# Patient Record
Sex: FEMALE | Race: WHITE | NOT HISPANIC OR LATINO | URBAN - METROPOLITAN AREA
[De-identification: names, ages, dates, MRNs, and addresses within clinical notes are randomized per-mention and may not be internally consistent; named-entity substitution may affect disease eponyms.]

---

## 2019-02-04 ASSESSMENT — MIFFLIN-ST. JEOR: SCORE: 1630.18

## 2019-02-06 ENCOUNTER — ANESTHESIA - HEALTHEAST (OUTPATIENT)
Dept: SURGERY | Facility: CLINIC | Age: 44
End: 2019-02-06

## 2019-02-07 ENCOUNTER — SURGERY - HEALTHEAST (OUTPATIENT)
Dept: SURGERY | Facility: CLINIC | Age: 44
End: 2019-02-07

## 2019-02-07 ASSESSMENT — MIFFLIN-ST. JEOR
SCORE: 1613.85
SCORE: 1594.74

## 2019-02-11 ENCOUNTER — COMMUNICATION - HEALTHEAST (OUTPATIENT)
Dept: SCHEDULING | Facility: CLINIC | Age: 44
End: 2019-02-11

## 2019-02-21 ENCOUNTER — RECORDS - HEALTHEAST (OUTPATIENT)
Dept: ADMINISTRATIVE | Facility: OTHER | Age: 44
End: 2019-02-21

## 2019-02-21 ENCOUNTER — HOSPITAL ENCOUNTER (OUTPATIENT)
Dept: CT IMAGING | Facility: CLINIC | Age: 44
Discharge: HOME | End: 2019-02-21
Attending: PHYSICIAN ASSISTANT

## 2019-02-21 DIAGNOSIS — G89.18 POST-OP PAIN: ICD-10-CM

## 2021-05-27 ENCOUNTER — RECORDS - HEALTHEAST (OUTPATIENT)
Dept: ADMINISTRATIVE | Facility: CLINIC | Age: 46
End: 2021-05-27

## 2021-06-02 VITALS — WEIGHT: 199.4 LBS | BODY MASS INDEX: 29.53 KG/M2 | HEIGHT: 69 IN

## 2021-06-16 PROBLEM — J45.20 MILD INTERMITTENT ASTHMA IN ADULT WITHOUT COMPLICATION: Chronic | Status: ACTIVE | Noted: 2019-02-07

## 2021-06-16 PROBLEM — K21.9 GERD WITHOUT ESOPHAGITIS: Chronic | Status: ACTIVE | Noted: 2019-02-07

## 2021-06-16 PROBLEM — M51.369 DISC DEGENERATION, LUMBAR: Status: ACTIVE | Noted: 2019-02-07

## 2021-06-16 PROBLEM — F51.01 PRIMARY INSOMNIA: Chronic | Status: ACTIVE | Noted: 2019-02-07

## 2021-06-23 NOTE — ANESTHESIA POSTPROCEDURE EVALUATION
Patient: Shireen Prado  BILATERAL LUMBAR 4-5 ANTERIOR AND POSTERIOR LUMBAR INTERBODY FUSION WITH ILIAC CREST BONE GRAFT, EXPOSURE AND CLOSURE, LUMBAR ANTERIOR APPROACH, FOR SPINAL SURGERY, BY GENERAL SURGERY  Anesthesia type: general    Patient location: PACU  Last vitals:   Vitals:    02/07/19 1450   BP: 123/74   Pulse: 95   Resp: 16   Temp: 37.1  C (98.8  F)   SpO2: 100%     Post vital signs: stable  Level of consciousness: awake and responds to simple questions  Post-anesthesia pain: pain controlled  Post-anesthesia nausea and vomiting: no  Pulmonary: unassisted, return to baseline  Cardiovascular: stable and blood pressure at baseline  Hydration: adequate  Anesthetic events: no    QCDR Measures:  ASA# 11 - Cinda-op Cardiac Arrest: ASA11B - Patient did NOT experience unanticipated cardiac arrest  ASA# 12 - Cinda-op Mortality Rate: ASA12B - Patient did NOT die  ASA# 13 - PACU Re-Intubation Rate: ASA13B - Patient did NOT require a new airway mgmt  ASA# 10 - Composite Anes Safety: ASA10A - No serious adverse event    Additional Notes:

## 2021-06-23 NOTE — ANESTHESIA CARE TRANSFER NOTE
Last vitals:   Vitals:    02/07/19 1142   BP: 121/72   Pulse: 95   Resp: 16   Temp: 36.9  C (98.4  F)   SpO2: 100%     Patient's level of consciousness is drowsy  Spontaneous respirations: yes  Maintains airway independently: yes  Dentition unchanged: yes  Oropharynx: oropharynx clear of all foreign objects    QCDR Measures:  ASA# 20 - Surgical Safety Checklist: WHO surgical safety checklist completed prior to induction    PQRS# 430 - Adult PONV Prevention: 4558F - Pt received => 2 anti-emetic agents (different classes) preop & intraop  ASA# 8 - Peds PONV Prevention: NA - Not pediatric patient, not GA or 2 or more risk factors NOT present  PQRS# 424 - Cinda-op Temp Management: 4559F - At least one body temp DOCUMENTED => 35.5C or 95.9F within required timeframe  PQRS# 426 - PACU Transfer Protocol: - Transfer of care checklist used  ASA# 14 - Acute Post-op Pain: ASA14B - Patient did NOT experience pain >= 7 out of 10

## 2021-06-23 NOTE — TELEPHONE ENCOUNTER
Discharged from [facility] after spinal fusion done 2/10/19 by Dr. Riley Jamil orthopedics    States they were doing the dressing change and they found a blister the size of the peanut.   This is on her Hip incision site and it is itchy, located at the edge of the gauze, not under the tape.   Questions if it is from the back brace rubbing.     No fever  No other symptoms    Requests call from surgeon to find best course of action.     Okay to leave a detailed message on voicemail.     Pharmacy verified    Paged General Surgery who states to page Dr. Jamil.     Paged Dr. Lucero from [facility]. They will call patient to discuss.     Marilia Lomeli RN Triage Care Connection      Reason for Disposition    [1] Caller has URGENT question AND [2] triager unable to answer question    Protocols used: POST-OP INCISION SYMPTOMS-A-AH

## 2021-06-27 ENCOUNTER — HEALTH MAINTENANCE LETTER (OUTPATIENT)
Age: 46
End: 2021-06-27

## 2021-10-17 ENCOUNTER — HEALTH MAINTENANCE LETTER (OUTPATIENT)
Age: 46
End: 2021-10-17

## 2022-07-24 ENCOUNTER — HEALTH MAINTENANCE LETTER (OUTPATIENT)
Age: 47
End: 2022-07-24

## 2022-10-02 ENCOUNTER — HEALTH MAINTENANCE LETTER (OUTPATIENT)
Age: 47
End: 2022-10-02

## 2023-08-12 ENCOUNTER — HEALTH MAINTENANCE LETTER (OUTPATIENT)
Age: 48
End: 2023-08-12

## 2025-01-21 ENCOUNTER — TRANSFERRED RECORDS (OUTPATIENT)
Dept: HEALTH INFORMATION MANAGEMENT | Facility: CLINIC | Age: 50
End: 2025-01-21
Payer: COMMERCIAL

## 2025-01-30 ENCOUNTER — MEDICAL CORRESPONDENCE (OUTPATIENT)
Dept: HEALTH INFORMATION MANAGEMENT | Facility: CLINIC | Age: 50
End: 2025-01-30
Payer: COMMERCIAL

## 2025-02-04 ENCOUNTER — TRANSCRIBE ORDERS (OUTPATIENT)
Dept: OTHER | Age: 50
End: 2025-02-04

## 2025-02-04 DIAGNOSIS — R93.89 ABNORMAL MRI: Primary | ICD-10-CM

## 2025-02-05 NOTE — TELEPHONE ENCOUNTER
Records Requested   February 5, 2025 10:44 AM   [id number]   Facility  [facility]   Outcome 10:44 am Sent request for imaging to be pushed to PACS. -MIRZA Conrad on 2/5/2025 at 3:47 PM Imaging resolved into PACS. -MIRZA     RECORDS RECEIVED FROM: Care Everywhere   REASON FOR VISIT: Abnormal MRI   PROVIDER: Samuel Fletcher MD   DATE OF APPT: 2/10/25 @ 4:30 pm    NOTES (FOR ALL VISITS) STATUS DETAILS   OFFICE NOTE from referring provider Care Everywhere 2/4/25 (Transcribed Orders), 1/21/25 Adalgisa Barcenas PA-C @[facility]     MEDICATION LIST Internal    IMAGING  (FOR ALL VISITS)     MRI (HEAD, NECK, SPINE) PACS [facility]  1/27/25 MR Brain/Neck

## 2025-02-06 NOTE — PROGRESS NOTES
[facility] Neurology Consultation    Shireen Prado MRN# [id number]   Age: 49 year old YOB: 1975     Requesting physician: Adalgisa Bridges     Reason for Consultation: dizziness           Assessment and Plan:   Assessment:  Shireen Prado is a 49 year old female who presents today for evaluation of episodic dizziness. Patient reports episodes of non-positional lightheadedness with associated tingling of extremities lasting for several minutes. She recently had MRI brain imaging, which did not show a cause of symptoms. We discussed doing heart monitor to evaluate for arrhythmia and EEG to evaluate for epileptiform activity. Vasovagal reaction is a consideration, but is a bit atypical based on her description.     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 We will do repeat imaging in about 6 months to monitor stability.      Plan:  - MRA head without contrast  - EEG 3 hours  - Zio patch 2 weeks    Follow up in Neurology clinic pending above    Samuel Fletcher MD   of Neurology  [facility]  ---------------------------------------------------------------------------------------------------------------------------        History of Presenting Symptoms:   Shireen Prado is a 49 year old female who presents today for evaluation of episodic dizziness.     Patient had episodes of dizziness on January 16th, 17th, 18th. The first one she was just sitting there and suddenly it happened. It is a lightheaded feeling like she might pass out. Things don't spinning. She then develops tingling in the extremities. It just last a few minutes. She denies heart palpitations.The 3 episodes were identical except the last one lasted longer. It was like 3-5 minutes.      Her blood pressure was normal during one episode. Her pulse was normal when she checked it too, but that was after intense symptoms passed.     She has a little bit of a headache after it passes and  a foggy feeling for an hour.     She has had episodes previously of this same dizziness. The first one happened while she walking on the beach in 2023. She had one episode when she laid in bed. It had been happening every few months.     She passed out a lot when she was a kid. These were related to holding her breath.     She also got knocked out with a concussion previously. She has no personal or family history of seizures. She has no history of brain infections.     She has had some mild headaches lately. They aren't bad enough to take a medication.       Past Medical History:     Patient Active Problem List   Diagnosis    Disc degeneration, lumbar    Mild intermittent asthma in adult without complication    Primary insomnia    GERD without esophagitis     No past medical history on file.     Past Surgical History:     Past Surgical History:   Procedure Laterality Date    ARTHROSCOPY KNEE       SECTION      ENDOMETRIAL ABLATION      LASIK      SHOULDER SURGERY      TUBAL LIGATION      WISDOM TOOTH EXTRACTION      ZZC LUMBAR SPINE FUSN,POST INTRBDY N/A 2019    Procedure: BILATERAL LUMBAR 4-5 ANTERIOR AND POSTERIOR LUMBAR INTERBODY FUSION WITH ILIAC CREST BONE GRAFT;  Surgeon: Riley Jamil MD;  Location: [facility] OR;  Service: Spine        Social History:     Social History     Tobacco Use    Smoking status: Never    Smokeless tobacco: Never   Substance Use Topics    Alcohol use: Yes     Comment: Alcoholic Drinks/day: occasional        Family History:   No family history on file.     Medications:     Current Outpatient Medications   Medication Sig Dispense Refill    acetaminophen (TYLENOL) 500 MG tablet [ACETAMINOPHEN (TYLENOL) 500 MG TABLET] Take 1 tablet (500 mg total) by mouth every 4 (four) hours.  0    albuterol (PROAIR HFA;PROVENTIL HFA;VENTOLIN HFA) 90 mcg/actuation inhaler [ALBUTEROL (PROAIR HFA;PROVENTIL HFA;VENTOLIN HFA) 90 MCG/ACTUATION INHALER] Inhale 2 puffs  every 6 (six) hours as needed for wheezing.      aspirin 81 MG EC tablet [ASPIRIN 81 MG EC TABLET] Take 1 tablet (81 mg total) by mouth daily.  0    cycloSPORINE (RESTASIS) 0.05 % ophthalmic emulsion [CYCLOSPORINE (RESTASIS) 0.05 % OPHTHALMIC EMULSION] 1 drop 2 (two) times a day.      esomeprazole (NEXIUM) 20 MG capsule [ESOMEPRAZOLE (NEXIUM) 20 MG CAPSULE] Take 20 mg by mouth daily before breakfast.      melatonin 3 mg Tab tablet [MELATONIN 3 MG TAB TABLET] Take 1 tablet (3 mg total) by mouth at bedtime as needed.  0    oxyCODONE (ROXICODONE) 5 MG immediate release tablet [OXYCODONE (ROXICODONE) 5 MG IMMEDIATE RELEASE TABLET] Take 1 tablet (5 mg total) by mouth every 4 (four) hours. 40 tablet 0    polyethylene glycol (MIRALAX) 17 gram packet [POLYETHYLENE GLYCOL (MIRALAX) 17 GRAM PACKET] Take 1 packet (17 g total) by mouth 2 (two) times a day.  0    senna-docusate (PERICOLACE) 8.6-50 mg tablet [SENNA-DOCUSATE (PERICOLACE) 8.6-50 MG TABLET] Take 1 tablet by mouth 2 (two) times a day.  0     No current facility-administered medications for this visit.        Allergies:     Allergies   Allergen Reactions    Pineapple Anaphylaxis    Morphine Nausea and Vomiting    Fluticasone-Salmeterol Hives    Terbutaline Hives        Review of Systems:   As above     Physical Exam:   Vitals: BP (!) 136/92 (BP Location: Right arm, Patient Position: Sitting, Cuff Size: Adult Large)   Pulse 73   Resp 16   SpO2 99%  BP sitting 130/89 HR 71 / standing 3 minutes 136/89 HR 66  General: Seated comfortably in no acute distress.  HEENT: Optic discs appear sharp on funduscopic exam.   Lungs: breathing comfortably  Neurologic:     Mental Status: Fully alert, attentive. Language normal, speech clear and fluent, no paraphasic errors.      Cranial Nerves: Visual fields intact. PERRL. EOMI with normal smooth pursuit. Facial sensation intact/symmetric. Facial movements symmetric. Hearing not formally tested but intact to conversation. Palate  elevation symmetric, uvula midline. No dysarthria. Shoulder shrug strong bilaterally. Tongue protrusion midline.     Motor: No tremors or other abnormal movements observed. Muscle tone normal throughout. Strength 5/5 throughout upper and lower extremities.     Deep Tendon Reflexes: 2+/symmetric throughout upper and lower extremities. No clonus.      Sensory: Intact/symmetric to light touch throughout upper and lower extremities.      Coordination: Finger-nose-finger and heel-shin intact without dysmetria.      Gait: Normal, steady casual gait.         Data: Pertinent prior to visit   Imaging:  HEAD MRI:   1.  Age-appropriate brain MRI without evidence for acute intracranial process.     HEAD MRA:   1.  No significant stenosis, branch occlusion, or high flow vascular malformation identified.   2.  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     NECK MRA:   1.  No hemodynamically significant stenosis in the neck vessels by NASCET criteria.   2.  No evidence for dissection.       The total time of this encounter today amounted to 47 minutes. This time included time spent with the patient, prep work, ordering tests, and performing post visit documentation.

## 2025-02-10 ENCOUNTER — OFFICE VISIT (OUTPATIENT)
Dept: NEUROLOGY | Facility: CLINIC | Age: 50
End: 2025-02-10
Attending: PHYSICIAN ASSISTANT
Payer: COMMERCIAL

## 2025-02-10 ENCOUNTER — PRE VISIT (OUTPATIENT)
Dept: NEUROLOGY | Facility: CLINIC | Age: 50
End: 2025-02-10

## 2025-02-10 VITALS
OXYGEN SATURATION: 99 % | DIASTOLIC BLOOD PRESSURE: 92 MMHG | SYSTOLIC BLOOD PRESSURE: 136 MMHG | RESPIRATION RATE: 16 BRPM | HEART RATE: 73 BPM

## 2025-02-10 DIAGNOSIS — R93.89 ABNORMAL MRI: ICD-10-CM

## 2025-02-10 DIAGNOSIS — R42 DIZZINESS: ICD-10-CM

## 2025-02-10 DIAGNOSIS — I67.1 CEREBRAL ANEURYSM, NONRUPTURED: ICD-10-CM

## 2025-02-10 DIAGNOSIS — R29.818 TRANSIENT NEUROLOGICAL SYMPTOMS: Primary | ICD-10-CM

## 2025-02-10 NOTE — NURSING NOTE
Chief Complaint   Patient presents with    New Patient     Here for a consult, confirmed with patient     Grecia Nevarez

## 2025-02-10 NOTE — LETTER
2/10/2025       RE: Shireen Prado  [address] [facility] [address]     Dear Colleague,    Thank you for referring your patient, Shireen Prado, to the [facility] NEUROLOG[facility] [place] at [facility]. Please see a copy of my visit note below.    [facility] Neurology Consultation    Shireen Prado MRN# [id number]   Age: 49 year old YOB: 1975     Requesting physician: Adalgisa Bridges     Reason for Consultation: dizziness           Assessment and Plan:   Assessment:  Shireen Prado is a 49 year old female who presents today for evaluation of episodic dizziness. Patient reports episodes of non-positional lightheadedness with associated tingling of extremities lasting for several minutes. She recently had MRI brain imaging, which did not show a cause of symptoms. We discussed doing heart monitor to evaluate for arrhythmia and EEG to evaluate for epileptiform activity. Vasovagal reaction is a consideration, but is a bit atypical based on her description.     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 We will do repeat imaging in about 6 months to monitor stability.      Plan:  - MRA head without contrast  - EEG 3 hours  - Zio patch 2 weeks    Follow up in Neurology clinic pending above    Samuel Fletcher MD   of Neurology  [facility]  ---------------------------------------------------------------------------------------------------------------------------        History of Presenting Symptoms:   Shireen Prado is a 49 year old female who presents today for evaluation of episodic dizziness.     Patient had episodes of dizziness on January 16th, 17th, 18th. The first one she was just sitting there and suddenly it happened. It is a lightheaded feeling like she might pass out. Things don't spinning. She then develops tingling in the extremities. It just last a few minutes. She denies heart  palpitations.The 3 episodes were identical except the last one lasted longer. It was like 3-5 minutes.      Her blood pressure was normal during one episode. Her pulse was normal when she checked it too, but that was after intense symptoms passed.     She has a little bit of a headache after it passes and a foggy feeling for an hour.     She has had episodes previously of this same dizziness. The first one happened while she walking on the beach in 2023. She had one episode when she laid in bed. It had been happening every few months.     She passed out a lot when she was a kid. These were related to holding her breath.     She also got knocked out with a concussion previously. She has no personal or family history of seizures. She has no history of brain infections.     She has had some mild headaches lately. They aren't bad enough to take a medication.       Past Medical History:     Patient Active Problem List   Diagnosis     Disc degeneration, lumbar     Mild intermittent asthma in adult without complication     Primary insomnia     GERD without esophagitis     No past medical history on file.     Past Surgical History:     Past Surgical History:   Procedure Laterality Date     ARTHROSCOPY KNEE        SECTION       ENDOMETRIAL ABLATION       LASIK       SHOULDER SURGERY       TUBAL LIGATION       WISDOM TOOTH EXTRACTION       ZZC LUMBAR SPINE FUSN,POST INTRBDY N/A 2019    Procedure: BILATERAL LUMBAR 4-5 ANTERIOR AND POSTERIOR LUMBAR INTERBODY FUSION WITH ILIAC CREST BONE GRAFT;  Surgeon: Riley Jamil MD;  Location: [facility];  Service: Spine        Social History:     Social History     Tobacco Use     Smoking status: Never     Smokeless tobacco: Never   Substance Use Topics     Alcohol use: Yes     Comment: Alcoholic Drinks/day: occasional        Family History:   No family history on file.     Medications:     Current Outpatient Medications   Medication Sig Dispense  Refill     acetaminophen (TYLENOL) 500 MG tablet [ACETAMINOPHEN (TYLENOL) 500 MG TABLET] Take 1 tablet (500 mg total) by mouth every 4 (four) hours.  0     albuterol (PROAIR HFA;PROVENTIL HFA;VENTOLIN HFA) 90 mcg/actuation inhaler [ALBUTEROL (PROAIR HFA;PROVENTIL HFA;VENTOLIN HFA) 90 MCG/ACTUATION INHALER] Inhale 2 puffs every 6 (six) hours as needed for wheezing.       aspirin 81 MG EC tablet [ASPIRIN 81 MG EC TABLET] Take 1 tablet (81 mg total) by mouth daily.  0     cycloSPORINE (RESTASIS) 0.05 % ophthalmic emulsion [CYCLOSPORINE (RESTASIS) 0.05 % OPHTHALMIC EMULSION] 1 drop 2 (two) times a day.       esomeprazole (NEXIUM) 20 MG capsule [ESOMEPRAZOLE (NEXIUM) 20 MG CAPSULE] Take 20 mg by mouth daily before breakfast.       melatonin 3 mg Tab tablet [MELATONIN 3 MG TAB TABLET] Take 1 tablet (3 mg total) by mouth at bedtime as needed.  0     oxyCODONE (ROXICODONE) 5 MG immediate release tablet [OXYCODONE (ROXICODONE) 5 MG IMMEDIATE RELEASE TABLET] Take 1 tablet (5 mg total) by mouth every 4 (four) hours. 40 tablet 0     polyethylene glycol (MIRALAX) 17 gram packet [POLYETHYLENE GLYCOL (MIRALAX) 17 GRAM PACKET] Take 1 packet (17 g total) by mouth 2 (two) times a day.  0     senna-docusate (PERICOLACE) 8.6-50 mg tablet [SENNA-DOCUSATE (PERICOLACE) 8.6-50 MG TABLET] Take 1 tablet by mouth 2 (two) times a day.  0     No current facility-administered medications for this visit.        Allergies:     Allergies   Allergen Reactions     Pineapple Anaphylaxis     Morphine Nausea and Vomiting     Fluticasone-Salmeterol Hives     Terbutaline Hives        Review of Systems:   As above     Physical Exam:   Vitals: BP (!) 136/92 (BP Location: Right arm, Patient Position: Sitting, Cuff Size: Adult Large)   Pulse 73   Resp 16   SpO2 99%  BP sitting 130/89 HR 71 / standing 3 minutes 136/89 HR 66  General: Seated comfortably in no acute distress.  HEENT: Optic discs appear sharp on funduscopic exam.   Lungs: breathing  comfortably  Neurologic:     Mental Status: Fully alert, attentive. Language normal, speech clear and fluent, no paraphasic errors.      Cranial Nerves: Visual fields intact. PERRL. EOMI with normal smooth pursuit. Facial sensation intact/symmetric. Facial movements symmetric. Hearing not formally tested but intact to conversation. Palate elevation symmetric, uvula midline. No dysarthria. Shoulder shrug strong bilaterally. Tongue protrusion midline.     Motor: No tremors or other abnormal movements observed. Muscle tone normal throughout. Strength 5/5 throughout upper and lower extremities.     Deep Tendon Reflexes: 2+/symmetric throughout upper and lower extremities. No clonus.      Sensory: Intact/symmetric to light touch throughout upper and lower extremities.      Coordination: Finger-nose-finger and heel-shin intact without dysmetria.      Gait: Normal, steady casual gait.         Data: Pertinent prior to visit   Imaging:  HEAD MRI:   1.  Age-appropriate brain MRI without evidence for acute intracranial process.     HEAD MRA:   1.  No significant stenosis, branch occlusion, or high flow vascular malformation identified.   2.  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     NECK MRA:   1.  No hemodynamically significant stenosis in the neck vessels by NASCET criteria.   2.  No evidence for dissection.       The total time of this encounter today amounted to 47 minutes. This time included time spent with the patient, prep work, ordering tests, and performing post visit documentation.      Again, thank you for allowing me to participate in the care of your patient.      Sincerely,    Samuel Fletcher MD     [Fully active, able to carry on all pre-disease performance without restriction] : Status 0 - Fully active, able to carry on all pre-disease performance without restriction [Normal] : affect appropriate

## 2025-02-11 ENCOUNTER — TELEPHONE (OUTPATIENT)
Dept: NEUROLOGY | Facility: CLINIC | Age: 50
End: 2025-02-11
Payer: COMMERCIAL

## 2025-02-11 NOTE — TELEPHONE ENCOUNTER
Spoke with patient and provided her the phone number to scheduled her EEG. Phone number provided was [phone].    Leeanna Newton on 2/11/2025 at 4:27 PM

## 2025-02-16 ENCOUNTER — HEALTH MAINTENANCE LETTER (OUTPATIENT)
Age: 50
End: 2025-02-16

## 2025-03-28 ENCOUNTER — ANCILLARY PROCEDURE (OUTPATIENT)
Dept: NEUROLOGY | Facility: CLINIC | Age: 50
End: 2025-03-28
Attending: INTERNAL MEDICINE
Payer: COMMERCIAL

## 2025-03-28 DIAGNOSIS — R29.818 TRANSIENT NEUROLOGICAL SYMPTOMS: ICD-10-CM

## 2025-03-28 DIAGNOSIS — R42 DIZZINESS: ICD-10-CM

## 2025-03-28 PROCEDURE — 95700 EEG CONT REC W/VID EEG TECH: CPT | Performed by: PSYCHIATRY & NEUROLOGY

## 2025-03-28 PROCEDURE — 95718 EEG PHYS/QHP 2-12 HR W/VEEG: CPT | Performed by: PSYCHIATRY & NEUROLOGY

## 2025-03-28 PROCEDURE — 95713 VEEG 2-12 HR CONT MNTR: CPT | Performed by: PSYCHIATRY & NEUROLOGY

## 2025-03-31 NOTE — TELEPHONE ENCOUNTER
Action 3/31/25 MV 2.43pm   Action Taken Imaging request faxed to [facility] for asterik images       RECORDS RECEIVED FROM: internal   REASON FOR VISIT: Transient neurological symptoms  R42 (ICD-10-CM) - Dizziness    PROVIDER: Dr. Sparks   DATE OF APPT: 5/14/25   NOTES (FOR ALL VISITS) STATUS DETAILS   OFFICE NOTE from referring provider Internal Dr Fletcher @ [facility] Neuro:  3/31/25 encounter  2/10/25   MEDICATION LIST Internal    IMAGING  (FOR ALL VISITS)     MRI (HEAD, NECK, SPINE) In process [facility]:  MRI Brain 3/10/25*  MRI Brain Neck 1/29/25   CT (HEAD, NECK, SPINE) In process [facility]:  CTA Head Neck 3/10/25*

## 2025-05-14 ENCOUNTER — PRE VISIT (OUTPATIENT)
Dept: NEUROLOGY | Facility: CLINIC | Age: 50
End: 2025-05-14

## 2025-05-14 ENCOUNTER — OFFICE VISIT (OUTPATIENT)
Dept: NEUROLOGY | Facility: CLINIC | Age: 50
End: 2025-05-14
Payer: COMMERCIAL

## 2025-05-14 VITALS
RESPIRATION RATE: 16 BRPM | DIASTOLIC BLOOD PRESSURE: 86 MMHG | HEART RATE: 72 BPM | SYSTOLIC BLOOD PRESSURE: 128 MMHG | OXYGEN SATURATION: 98 %

## 2025-05-14 DIAGNOSIS — G40.009 PARTIAL IDIOPATHIC EPILEPSY WITH SEIZURES OF LOCALIZED ONSET, NOT INTRACTABLE, WITHOUT STATUS EPILEPTICUS (H): Primary | ICD-10-CM

## 2025-05-14 DIAGNOSIS — R42 DIZZINESS: ICD-10-CM

## 2025-05-14 DIAGNOSIS — R29.818 TRANSIENT NEUROLOGICAL SYMPTOMS: ICD-10-CM

## 2025-05-14 RX ORDER — TRIAMCINOLONE ACETONIDE 1 MG/G
CREAM TOPICAL PRN
COMMUNITY
Start: 2025-01-21

## 2025-05-14 RX ORDER — FAMOTIDINE 20 MG/1
20 TABLET, FILM COATED ORAL PRN
COMMUNITY

## 2025-05-14 RX ORDER — FEXOFENADINE HCL 180 MG/1
180 TABLET ORAL DAILY
COMMUNITY
Start: 2025-04-01

## 2025-05-14 RX ORDER — BUDESONIDE AND FORMOTEROL FUMARATE DIHYDRATE 80; 4.5 UG/1; UG/1
2 AEROSOL RESPIRATORY (INHALATION) PRN
COMMUNITY
Start: 2025-01-21

## 2025-05-14 RX ORDER — OMEPRAZOLE 20 MG/1
20 CAPSULE, DELAYED RELEASE ORAL PRN
COMMUNITY

## 2025-05-14 ASSESSMENT — PAIN SCALES - GENERAL: PAINLEVEL_OUTOF10: NO PAIN (0)

## 2025-05-14 NOTE — PROGRESS NOTES
[facility] Epilepsy Care History and Physical       Patient:  Shireen Prado  :  1975   Age:  49 year old   Today's Office Visit:  2025    Referring Provider:    Referred Self, MD  No address on file    Epilepsy Data:      Chief complaint  Recurrent episodes of dizziness and confusion, suspected to be seizures, occurring since 2023, with increased frequency starting 2025.    49 year old right handed, came in the clinic alone  Works as  at [employer] de[employer]      History of present illness  - Experienced a sudden, unexplained sensation in 2023 while walking in [place], [place], thought it was due to sun exposure; resolved after resting.  - Similar episodes occurred sporadically every few months, typically lasting 1-3 minutes, with a feeling of confusion and tingling.  - In 2025, experienced episodes three days in a row, prompting a doctor's visit.  - Brain MRI revealed a small aneurysm; doctor suggested it was unrelated to symptoms.  - Episodes increased in frequency to weekly occurrences from January to early 2025.  - Visited the ER during a severe episode that did not resolve quickly; CT and MRI were performed.  - Episodes often occur while working at the computer, involve difficulty focusing, and leave a feeling similar to being hungover.  - Past history of multiple concussions during childhood and high school sports.  - Reported anxiety increasing with age, particularly in crowded situations.    Past medical history  - Anxiety  - History of multiple concussions in childhood and high school    Past obstetric history  - Premature labor treated with terbutaline    Family history  - No known family history of epilepsy    Social history  - Works in IT, specifically radiology IT  - Works from home  - Has an associate's degree  - Engages in exercise, including walking and intermittent running    Allergies  - Allergic to terbutaline,  causing hives  - Allergic to Advair, causing hives      Epilepsy Risk Factors:  Head Trauma  Precipitating factors:   Stress  No past medical history on file.   Past Surgical History:   Procedure Laterality Date    ARTHROSCOPY KNEE       SECTION      ENDOMETRIAL ABLATION      LASIK      SHOULDER SURGERY      TUBAL LIGATION      WISDOM TOOTH EXTRACTION      ZZC LUMBAR SPINE FUSN,POST INTRBDY N/A 2019    Procedure: BILATERAL LUMBAR 4-5 ANTERIOR AND POSTERIOR LUMBAR INTERBODY FUSION WITH ILIAC CREST BONE GRAFT;  Surgeon: Rilye Jamil MD;  Location: [facility] Main OR;  Service: Spine     No family history on file.   Social History     Socioeconomic History    Marital status:    Tobacco Use    Smoking status: Never    Smokeless tobacco: Never   Substance and Sexual Activity    Alcohol use: Yes     Comment: Alcoholic Drinks/day: occasional     Social Drivers of Health     Financial Resource Strain: Not on File (2024)    Received from [facility]    Financial Resource Strain     Financial Resource Strain: 0   Food Insecurity: Not on File (2024)    Received from [facility]    Food Insecurity     Food: 0   Transportation Needs: Not on File (2024)    Received from [facility]    Transportation Needs     Transportation: 0   Physical Activity: Not on File (2024)    Received from [facility]    Physical Activity     Physical Activity: 0   Stress: Not on File (2024)    Received from [facility]    Stress     Stress: 0   Social Connections: Not on File (2024)    Received from [facility]    Social Connections     Connectedness: 0   Housing Stability: Not on File (2024)    Received from [facility]    Housing Stability     Housin      Employment/School:  Employed full time  Driving:  Currently patient is:  Driving: Patient was made aware of state driving laws. Currently meets criteria to continue to drive.    Previous Evaluations for Epilepsy:   EEG: OSH reported left temporal focal slowing  MRI of Brain:  OSH reported no epileptogenic lesion. 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      Brivaracetam (BRIVIACT) 25 MG tablet Take 1 tablet (25 mg) by mouth 2 times daily. 120 tablet 0    budesonide-formoterol (SYMBICORT/BREYNA) 80-4.5 MCG/ACT Inhaler Inhale 2 puffs into the lungs as needed.      famotidine (PEPCID) 20 MG tablet Take 20 mg by mouth as needed.      fexofenadine (ALLEGRA) 180 MG tablet Take 180 mg by mouth daily.      omeprazole (PRILOSEC) 20 MG DR capsule Take 20 mg by mouth as needed.      triamcinolone (KENALOG) 0.1 % external cream Apply topically as needed.         Perceived AED Side Effects: No    Medication Notes:   AED Medication Compliance:  none    Past AEDs:      5/14/2025     3:13 PM   AED - ANTIEPILEPTIC DRUGS   Brivaracetam 25 mg BID PO         Exam:    /86 (BP Location: Right arm, Patient Position: Sitting, Cuff Size: Adult Large)   Pulse 72   Resp 16   SpO2 98%      Wt Readings from Last 5 Encounters:   02/07/19 199 lb 6.4 oz (90.4 kg)       General Appearance: Normal    Gait:  Normal  Attention Span:  Normal  Language:  Normal  Extraocular Movements:  Normal  Coordination:  Normal  Visual Fields:  Normal  Facial Sensation:  Normal  Facial Strength:  Normal  Tongue Strength:  Normal  Limb Strength:  Normal  Limb Tone:  Normal  Limb Sensation:  Normal  General Physical Findings:  Normal    Assessment and Plan:     49 year old right handed with spells lasting 2-3 mins. From description, they are concerning for FOCAL AWARE  SEIZURE.     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  Possible temporal lobe sz given the description.    Routine EEG showed left temporal intermittent focal slowing    MRI brain negative for brain tumor/stroke    Assessment  - Seizures, likely focal epilepsy, as indicated by the distinct on-and-off nature of the episodes and the EEG findings showing one side of the brain firing slower than the other.  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    Plan    - Initiate anti-seizure medication BRIVIACT at a starting dose of 25 mg twice daily. After one to two weeks, increase the dosage to 50 mg twice daily. The goal is to find a balance where seizures are controlled without significant side effects. The typical treatment dose may range from 100 to 150 mg twice daily, but adjustments will be made based on response and side effects.  - Consider undergoing a home-based EEG monitoring for three days to capture any seizure activity (but pat may postpone this test at present) This is optional and can be decided by the patient based on their preference and financial considerations.  - Schedule a follow-up appointment in six months to assess the effectiveness of the treatment and make any necessary adjustments.  - Maintain a detailed diary of seizure episodes, noting the frequency, duration, and any associated symptoms. This will help in evaluating the effectiveness of the treatment and guide any necessary changes.  - Explained as szs are with retaining awareness, perhaps can drive. However as szs can progress to impaired awareness my suggestion is to restrict from driving for three months as per state law, due to the occurrence of seizures. This is a precautionary measure to ensure safety, as there is a risk of seizures progressing to more severe forms.    Prescription  - Start with 25 mg twice a day, increase to 50 mg twice a day after two weeks.  - Typical  treatment dose is 100 to 150 mg twice a day.  - Side effects at higher doses may include cognitive slowing, sluggishness, and a feeling similar to mild alcohol intoxication.  - No interactions with alcohol or foods mentioned.    Appointments  - Follow-up appointment in 6 months    Visit diagnoses suggestions (3)  - Anxiety disorder, unspecified [F41.9]  - Cerebral ischemia [I67.82]  - Unspecified convulsions [R56.9]    Based on our discussion, I have outlined the following instructions for you:      - Start taking your anti-seizure medication at a dose of 25 mg twice a day. After one to two weeks, increase the dose to 50 mg twice a day. The aim is to control seizures without causing major side effects. The usual dose might be between 100 to 150 mg twice a day, but this will be adjusted based on how you respond and any side effects you experience.  - Think about doing a home-based test called electroencephalogram for three days to check for any seizure activity or signs of seizures. This is optional, and you can decide based on what you prefer and your budget.  - Keep a detailed diary of your seizure episodes. Write down how often they happen, how long they last, and any symptoms you notice. This will help in checking how well the treatment is working and guide any changes needed.  - Do not drive for three months because of the seizures, as required by state law. This is to keep you safe, as there is a risk that seizures could become more severe.    Next appointment(s): - Follow-up appointment in 6 months    Thank you again for your visit, and we look forward to supporting you in your journey to better health.      This note was created using [employer], Artificial Intelligence Transcription.           As described above, I met with the patient for 60 minutes and during this time counseling was greater than 50% of the visit time.

## 2025-05-14 NOTE — NURSING NOTE
Chief Complaint   Patient presents with    New Patient     New Seizure              /86 (BP Location: Right arm, Patient Position: Sitting, Cuff Size: Adult Large)   Pulse 72   Resp 16   SpO2 98%     Sheron Patterson

## 2025-05-14 NOTE — LETTER
2025       RE: Shireen Prado  [address]  [facility] [address]     Dear Colleague,    Thank you for referring your patient, Shireen Prado, to the [facility] NEUROLOGY [facility] [place] [facility]. Please see a copy of my visit note below.    [facility] Epilepsy Care History and Physical       Patient:  Shireen Prado  :  1975   Age:  49 year old   Today's Office Visit:  2025    Referring Provider:    Referred Self, MD  No address on file    Epilepsy Data:      Chief complaint  Recurrent episodes of dizziness and confusion, suspected to be seizures, occurring since 2023, with increased frequency starting 2025.    49 year old right handed, came in the clinic alone  Works as  at [employer] de[employer]      History of present illness  - Experienced a sudden, unexplained sensation in 2023 while walking in [place], [place], thought it was due to sun exposure; resolved after resting.  - Similar episodes occurred sporadically every few months, typically lasting 1-3 minutes, with a feeling of confusion and tingling.  - In 2025, experienced episodes three days in a row, prompting a doctor's visit.  - Brain MRI revealed a small aneurysm; doctor suggested it was unrelated to symptoms.  - Episodes increased in frequency to weekly occurrences from January to early 2025.  - Visited the ER during a severe episode that did not resolve quickly; CT and MRI were performed.  - Episodes often occur while working at the computer, involve difficulty focusing, and leave a feeling similar to being hungover.  - Past history of multiple concussions during childhood and high school sports.  - Reported anxiety increasing with age, particularly in crowded situations.    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   Past Surgical History:   Procedure Laterality Date     ARTHROSCOPY KNEE        SECTION       ENDOMETRIAL ABLATION       LASIK       SHOULDER SURGERY       TUBAL LIGATION       WISDOM TOOTH EXTRACTION       ZZC LUMBAR SPINE FUSN,POST INTRBDY N/A 2019    Procedure: BILATERAL LUMBAR 4-5 ANTERIOR AND POSTERIOR LUMBAR INTERBODY FUSION WITH ILIAC CREST BONE GRAFT;  Surgeon: Riley Jamil MD;  Location: [facility] OR;  Service: Spine     No family history on file.   Social History     Socioeconomic History     Marital status:    Tobacco Use     Smoking status: Never     Smokeless tobacco: Never   Substance and Sexual Activity     Alcohol use: Yes     Comment: Alcoholic Drinks/day: occasional     Social Drivers of Health     Financial Resource Strain: Not on File (2024)    Received from [employer]    Financial Resource Strain      Financial Resource Strain: 0   Food Insecurity: Not on File (2024)    Received from [employer]    Food Insecurity      Food: 0   Transportation Needs: Not on File (2024)    Received from [employer]    Transportation Needs      Transportation: 0   Physical Activity: Not on File (2024)    Received from [employer]    Physical Activity      Physical Activity: 0   Stress: Not on File (2024)    Received from [employer]    Stress      Stress: 0   Social Connections: Not on File (2024)    Received from [employer]    Social Connections      Connectedness: 0   Housing Stability: Not on File (2024)    [facility]    H[facility] Stability      Housin      Employment/School:  Employed full time  Driving:  Currently patient is:  Driving: Patient was made aware of state driving laws. Currently meets criteria to continue to drive.    Previous Evaluations for Epilepsy:   EEG: OSH reported left temporal focal slowing  MRI of Brain: OSH reported no epileptogenic lesion. 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       Brivaracetam (BRIVIACT) 25 MG tablet Take 1 tablet (25 mg) by mouth 2 times daily. 120 tablet 0     budesonide-formoterol (SYMBICORT/BREYNA) 80-4.5 MCG/ACT Inhaler Inhale 2 puffs into the lungs as needed.       famotidine (PEPCID) 20 MG tablet Take 20 mg by mouth as needed.       fexofenadine (ALLEGRA) 180 MG tablet Take 180 mg by mouth daily.       omeprazole (PRILOSEC) 20 MG DR capsule Take 20 mg by mouth as needed.       triamcinolone (KENALOG) 0.1 % external cream Apply topically as needed.         Perceived AED Side Effects: No    Medication Notes:   AED Medication Compliance:  none    Past AEDs:      2025     3:13 PM   AED - ANTIEPILEPTIC DRUGS   Brivaracetam 25 mg BID PO         Exam:    /86 (BP Location: Right arm, Patient Position: Sitting, Cuff Size: Adult Large)   Pulse 72   Resp 16   SpO2 98%      Wt Readings from Last 5 Encounters:   19 199 lb 6.4 oz (90.4 kg)       General Appearance: Normal    Gait:  Normal  Attention Span:  Normal  Language:  Normal  Extraocular Movements:  Normal  Coordination:  Normal  Visual Fields:   Normal  Facial Sensation:  Normal  Facial Strength:  Normal  Tongue Strength:  Normal  Limb Strength:  Normal  Limb Tone:  Normal  Limb Sensation:  Normal  General Physical Findings:  Normal    Assessment and Plan:     49 year old right handed with spells lasting 2-3 mins. From description, they are concerning for FOCAL AWARE SEIZURE.     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  Possible temporal lobe sz given the description.    Routine EEG showed left temporal intermittent focal slowing    MRI brain negative for brain tumor/stroke    Assessment  - Seizures, likely focal epilepsy, as indicated by the distinct on-and-off nature of the episodes and the EEG findings showing one side of the brain firing slower than the other.  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    Plan    - Initiate anti-seizure medication BRIVIACT at a starting dose of 25 mg twice daily. After one to two weeks, increase the dosage to 50 mg twice daily. The goal is to find a balance where seizures are controlled without significant side effects. The typical treatment dose may range from 100 to 150 mg twice daily, but adjustments will be made based on response and side effects.  - Consider undergoing a home-based EEG monitoring for three days to capture any seizure activity (but pat may postpone this test at present) This is optional and can be decided by the patient based on their preference and financial considerations.  - Schedule a follow-up appointment in six months to assess the effectiveness of the treatment and make any necessary adjustments.  - Maintain a detailed diary of seizure episodes, noting the frequency, duration, and any associated symptoms. This will help in evaluating the effectiveness of the treatment and guide any necessary changes.  - Explained as szs are with retaining awareness, perhaps can drive. However as szs can progress to impaired awareness my suggestion  is to restrict from driving for three months as per state law, due to the occurrence of seizures. This is a precautionary measure to ensure safety, as there is a risk of seizures progressing to more severe forms.    Prescription  - Start with 25 mg twice a day, increase to 50 mg twice a day after two weeks.  - Typical treatment dose is 100 to 150 mg twice a day.  - Side effects at higher doses may include cognitive slowing, sluggishness, and a feeling similar to mild alcohol intoxication.  - No interactions with alcohol or foods mentioned.    Appointments  - Follow-up appointment in 6 months    Visit diagnoses suggestions (3)  - Anxiety disorder, unspecified [F41.9]  - Cerebral ischemia [I67.82]  - Unspecified convulsions [R56.9]    Based on our discussion, I have outlined the following instructions for you:      - Start taking your anti-seizure medication at a dose of 25 mg twice a day. After one to two weeks, increase the dose to 50 mg twice a day. The aim is to control seizures without causing major side effects. The usual dose might be between 100 to 150 mg twice a day, but this will be adjusted based on how you respond and any side effects you experience.  - Think about doing a home-based test called electroencephalogram for three days to check for any seizure activity or signs of seizures. This is optional, and you can decide based on what you prefer and your budget.  - Keep a detailed diary of your seizure episodes. Write down how often they happen, how long they last, and any symptoms you notice. This will help in checking how well the treatment is working and guide any changes needed.  - Do not drive for three months because of the seizures, as required by state law. This is to keep you safe, as there is a risk that seizures could become more severe.    Next appointment(s): - Follow-up appointment in 6 months    Thank you again for your visit, and we look forward to supporting you in your journey to better  health.      This note was created using [employer], Artificial Intelligence Transcription.           As described above, I met with the patient for 60 minutes and during this time counseling was greater than 50% of the visit time.        Again, thank you for allowing me to participate in the care of your patient.      Sincerely,    Abby Sparks MD

## 2025-05-19 ENCOUNTER — TELEPHONE (OUTPATIENT)
Dept: NEUROLOGY | Facility: CLINIC | Age: 50
End: 2025-05-19
Payer: COMMERCIAL

## 2025-05-19 NOTE — TELEPHONE ENCOUNTER
Patient confirmed scheduled appointment:  Date: 11/19/25  Time: 1:00PM  Visit type: RETURN SEIZURE  Provider: KERI  Location: [facility]  Testing/imaging: N/A  Additional notes: N/A

## 2025-06-23 DIAGNOSIS — G40.009 PARTIAL IDIOPATHIC EPILEPSY WITH SEIZURES OF LOCALIZED ONSET, NOT INTRACTABLE, WITHOUT STATUS EPILEPTICUS (H): ICD-10-CM

## 2025-06-24 ENCOUNTER — MYC MEDICAL ADVICE (OUTPATIENT)
Dept: NEUROLOGY | Facility: CLINIC | Age: 50
End: 2025-06-24
Payer: COMMERCIAL

## 2025-06-24 DIAGNOSIS — G40.009 PARTIAL IDIOPATHIC EPILEPSY WITH SEIZURES OF LOCALIZED ONSET, NOT INTRACTABLE, WITHOUT STATUS EPILEPTICUS (H): ICD-10-CM

## 2025-06-26 RX ORDER — BRIVARACETAM 50 MG/1
50 TABLET, FILM COATED ORAL 2 TIMES DAILY
Qty: 60 TABLET | OUTPATIENT
Start: 2025-06-26

## 2025-08-05 ENCOUNTER — MEDICAL CORRESPONDENCE (OUTPATIENT)
Dept: HEALTH INFORMATION MANAGEMENT | Facility: CLINIC | Age: 50
End: 2025-08-05
Payer: COMMERCIAL

## 2025-08-31 ENCOUNTER — HEALTH MAINTENANCE LETTER (OUTPATIENT)
Age: 50
End: 2025-08-31